# Patient Record
Sex: FEMALE | Race: WHITE
[De-identification: names, ages, dates, MRNs, and addresses within clinical notes are randomized per-mention and may not be internally consistent; named-entity substitution may affect disease eponyms.]

---

## 2018-09-15 ENCOUNTER — HOSPITAL ENCOUNTER (EMERGENCY)
Dept: HOSPITAL 89 - ER | Age: 33
Discharge: ANOTHER HOSPITAL | End: 2018-09-15
Payer: SELF-PAY

## 2018-09-15 ENCOUNTER — HOSPITAL ENCOUNTER (OUTPATIENT)
Dept: HOSPITAL 89 - AMB | Age: 33
End: 2018-09-15
Payer: SELF-PAY

## 2018-09-15 VITALS — DIASTOLIC BLOOD PRESSURE: 96 MMHG | SYSTOLIC BLOOD PRESSURE: 138 MMHG

## 2018-09-15 DIAGNOSIS — F31.9: Primary | ICD-10-CM

## 2018-09-15 DIAGNOSIS — R44.0: Primary | ICD-10-CM

## 2018-09-15 LAB — PLATELET COUNT, AUTOMATED: 412 K/UL (ref 150–450)

## 2018-09-15 PROCEDURE — 82947 ASSAY GLUCOSE BLOOD QUANT: CPT

## 2018-09-15 PROCEDURE — 82435 ASSAY OF BLOOD CHLORIDE: CPT

## 2018-09-15 PROCEDURE — 80320 DRUG SCREEN QUANTALCOHOLS: CPT

## 2018-09-15 PROCEDURE — 80329 ANALGESICS NON-OPIOID 1 OR 2: CPT

## 2018-09-15 PROCEDURE — 83735 ASSAY OF MAGNESIUM: CPT

## 2018-09-15 PROCEDURE — 81001 URINALYSIS AUTO W/SCOPE: CPT

## 2018-09-15 PROCEDURE — 84155 ASSAY OF PROTEIN SERUM: CPT

## 2018-09-15 PROCEDURE — 84520 ASSAY OF UREA NITROGEN: CPT

## 2018-09-15 PROCEDURE — 82247 BILIRUBIN TOTAL: CPT

## 2018-09-15 PROCEDURE — 85025 COMPLETE CBC W/AUTO DIFF WBC: CPT

## 2018-09-15 PROCEDURE — 82374 ASSAY BLOOD CARBON DIOXIDE: CPT

## 2018-09-15 PROCEDURE — 84460 ALANINE AMINO (ALT) (SGPT): CPT

## 2018-09-15 PROCEDURE — 82040 ASSAY OF SERUM ALBUMIN: CPT

## 2018-09-15 PROCEDURE — 84132 ASSAY OF SERUM POTASSIUM: CPT

## 2018-09-15 PROCEDURE — 84075 ASSAY ALKALINE PHOSPHATASE: CPT

## 2018-09-15 PROCEDURE — 99285 EMERGENCY DEPT VISIT HI MDM: CPT

## 2018-09-15 PROCEDURE — 81025 URINE PREGNANCY TEST: CPT

## 2018-09-15 PROCEDURE — 84450 TRANSFERASE (AST) (SGOT): CPT

## 2018-09-15 PROCEDURE — 80305 DRUG TEST PRSMV DIR OPT OBS: CPT

## 2018-09-15 PROCEDURE — 84443 ASSAY THYROID STIM HORMONE: CPT

## 2018-09-15 PROCEDURE — 36415 COLL VENOUS BLD VENIPUNCTURE: CPT

## 2018-09-15 PROCEDURE — 82565 ASSAY OF CREATININE: CPT

## 2018-09-15 PROCEDURE — 84295 ASSAY OF SERUM SODIUM: CPT

## 2018-09-15 PROCEDURE — 82310 ASSAY OF CALCIUM: CPT

## 2018-09-15 NOTE — ER REPORT
History and Physical


Time Seen By MD:  16:10


HPI/ROS


CHIEF COMPLAINT: Needs bipolar meds adjusted





HISTORY OF PRESENT ILLNESS: 33-year-old female patient presents to emergency 


room with complaint of needing to have her bipolar meds adjusted. Patient states


she was at [facility] today and became very irate. She states police were called 


and they did bring her to the emergency room. Patient states she feels better 


now, however she feels she is very shaky and feels like she needs to have her 


medications adjusted. Patient states that she has been having problems with 


bipolar for years. She states that she has often had blood work done as well as 


urinalysis done to have this evaluated. Patient states that she feels like she 


needs to be admitted. She states she was traveling with family, but her family 


has continued on without her. Patient denies any suicidal ideation.





REVIEW OF SYSTEMS:


Respiratory: No cough, no dyspnea.


Cardiovascular: No chest pain, no palpitations.


Gastrointestinal: No vomiting, no abdominal pain.


Musculoskeletal: No back pain.


Allergies:  


Coded Allergies:  


     UNABLE TO OBTAIN (Unverified , 9/15/18)


Home Meds


Reported Medications


Topiramate (TOPIRAMATE) 100 Mg Tablet, 100 MG PO BID


   9/15/18


Olanzapine (ZYPREXA) 20 Mg Tablet, 20 MG PO QDAY


   9/15/18


Lamotrigine (LAMOTRIGINE) 100 Mg Tablet, 100 MG PO BID


   9/15/18


Past Medical/Surgical History


Patient has a past medical history of bipolar.


Patient denies any surgical history.


Reviewed Nurses Notes:  Yes


Constitutional





Vital Sign - Last 24 Hours








 9/15/18 9/15/18 9/15/18 9/15/18





 16:07 16:15 16:45 17:00


 


Temp 98.7   


 


Pulse 137 131 117 122


 


Resp 18 13 23 15


 


B/P (MAP) 149/90   142/90 (107)


 


Pulse Ox 92 93  


 


O2 Delivery Room Air   


 


    





 9/15/18 9/15/18 9/15/18 9/15/18





 17:15 17:45 17:54 18:00


 


Pulse 120 130  114


 


Resp 17 22  12


 


B/P (MAP)   137/91 (106) 120/92 (101)


 


Pulse Ox    93





 9/15/18 9/15/18 9/15/18 9/15/18





 18:15 18:30 18:45 19:00


 


Pulse 114 105 105 106


 


Resp 22 18 12 9


 


B/P (MAP)  133/86 (102)  138/96 (110)


 


Pulse Ox 92   





 9/15/18 9/15/18  





 19:05 19:20  


 


Pulse 112 102  


 


Resp 14 11  


 


Pulse Ox  93  








Physical Exam


  General Appearance: The patient is alert, has no immediate need for airway 


protection and no current signs of toxicity.


Respiratory: Chest is non tender, lungs are clear to auscultation.


Cardiac: regular rate and rhythm


Gastrointestinal: Abdomen is soft and non tender, no masses, bowel sounds normal


.


Musculoskeletal:  Neck: Neck is supple and non tender.


   Extremities have full range of motion and are non tender.


Skin: No rashes or lesions.


Psych: Patient does have pressured speech, she has flight of ideas, she is 


difficult time maintaining her train of thought.





DIFFERENTIAL DIAGNOSIS: After history and physical exam differential diagnosis 


was considered for bipolar, manic episode.





Medical Decision Making


Data Points


Result Diagram:  


9/15/18 1648                                                                    


           9/15/18 1637





Laboratory





Hematology








Test


 9/15/18


08:21 9/15/18


16:37 9/15/18


16:48


 


Urine Color  Yellow  


 


Urine Clarity


 


 Slightly-cloudy


 





 


Urine pH


 


 5.0 pH


(4.8-9.5) 





 


Urine Specific Gravity  1.017  


 


Urine Protein


 


 Negative mg/dL


(NEGATIVE) 





 


Urine Glucose (UA)


 


 Negative mg/dL


(NEGATIVE) 





 


Urine Ketones


 


 Negative mg/dL


(NEGATIVE) 





 


Urine Blood


 


 Negative


(NEGATIVE) 





 


Urine Nitrite


 


 Negative


(NEGATIVE) 





 


Urine Bilirubin


 


 Negative


(NEGATIVE) 





 


Urine Urobilinogen


 


 2.0 mg/dL


(0.2-1.9) 





 


Urine Leukocyte Esterase


 


 Negative


(NEGATIVE) 





 


Urine RBC


 


 None /HPF


(0-2/HPF) 





 


Urine WBC


 


 2 /HPF


(0-5/HPF) 





 


Urine Squamous Epithelial


Cells 


 Many /LPF


(</=FEW) 





 


Urine Bacteria


 


 Negative /HPF


(NONE-FEW) 





 


Urine Hyaline Casts


 


 Few /LPF


(NONE-FEW) 





 


Urine Mucus


 


 Few /HPF


(NONE-FEW) 





 


Urine HCG, Qualitative


 


 Negative


(NEGATIVE) 





 


Sodium Level


 


 140 mmol/L


(137-145) 





 


Potassium Level


 


 3.6 mmol/L


(3.5-5.0) 





 


Chloride Level


 


 99 mmol/L


() 





 


Carbon Dioxide Level


 


 27 mmol/L


(22-31) 





 


Blood Urea Nitrogen  4 mg/dl (7-18)  


 


Creatinine


 


 0.80 mg/dl


(0.52-1.04) 





 


Glomerular Filtration Rate


Calc 


 > 60.0 


 





 


Random Glucose


 


 121 mg/dl


() 





 


Calcium Level


 


 9.5 mg/dl


(8.4-10.2) 





 


Magnesium Level


 


 1.7 mg/dl


(1.7-2.2) 





 


Total Bilirubin


 


 0.4 mg/dl


(0.2-1.3) 





 


Aspartate Amino Transf


(AST/SGOT) 


 26 U/L (0-35) 


 





 


Alanine Aminotransferase


(ALT/SGPT) 


 55 U/L (0-56) 


 





 


Alkaline Phosphatase  31 U/L (0-126)  


 


Total Protein


 


 7.7 g/dl


(6.3-8.2) 





 


Albumin


 


 4.2 g/dl


(3.5-5.0) 





 


Salicylates Level  < 10 mg/L  


 


Salicylate Last Dose Date  unk  


 


Urine Opiates Screen  Negative  


 


Acetaminophen Level  < 10 ug/ml  


 


Urine Barbiturates Screen  Negative  


 


Ur Tricyclic Antidepressants


Screen 


 Negative 


 





 


Urine Phencyclidine Screen  Negative  


 


Urine Amphetamines Screen  Negative  


 


Urine Benzodiazepines Screen  Negative  


 


Urine Cocaine Screen  Negative  


 


Urine Cannabinoids Screen  Negative  


 


Serum Alcohol  < 10 mg/dl  


 


Red Blood Count


 


 


 4.75 M/uL


(4.17-5.56)


 


Mean Corpuscular Volume


 


 


 88.9 fL


(80.0-96.0)


 


Mean Corpuscular Hemoglobin


 


 


 30.3 pg


(26.0-33.0)


 


Mean Corpuscular Hemoglobin


Concent 


 


 34.1 g/dL


(32.0-36.0)


 


Red Cell Distribution Width


 


 


 12.6 %


(11.5-14.5)


 


Mean Platelet Volume


 


 


 7.2 fL


(7.2-11.1)


 


Neutrophils (%) (Auto)


 


 


 82.5 %


(39.4-72.5)


 


Lymphocytes (%) (Auto)


 


 


 13.0 %


(17.6-49.6)


 


Monocytes (%) (Auto)


 


 


 3.5 %


(4.1-12.4)


 


Eosinophils (%) (Auto)


 


 


 0.7 %


(0.4-6.7)


 


Basophils (%) (Auto)


 


 


 0.3 %


(0.3-1.4)


 


Nucleated RBC Relative Count


(auto) 


 


 0.0 /100WBC 





 


Neutrophils # (Auto)


 


 


 6.9 K/uL


(2.0-7.4)


 


Lymphocytes # (Auto)


 


 


 1.1 K/uL


(1.3-3.6)


 


Monocytes # (Auto)


 


 


 0.3 K/uL


(0.3-1.0)


 


Eosinophils # (Auto)


 


 


 0.1 K/uL


(0.0-0.5)


 


Basophils # (Auto)


 


 


 0.0 K/uL


(0.0-0.1)


 


Nucleated RBC Absolute Count


(auto) 


 


 0.00 K/uL 











Chemistry








Test


 9/15/18


08:21 9/15/18


16:37 9/15/18


16:48


 


Urine Color  Yellow  


 


Urine Clarity


 


 Slightly-cloudy


 





 


Urine pH


 


 5.0 pH


(4.8-9.5) 





 


Urine Specific Gravity  1.017  


 


Urine Protein


 


 Negative mg/dL


(NEGATIVE) 





 


Urine Glucose (UA)


 


 Negative mg/dL


(NEGATIVE) 





 


Urine Ketones


 


 Negative mg/dL


(NEGATIVE) 





 


Urine Blood


 


 Negative


(NEGATIVE) 





 


Urine Nitrite


 


 Negative


(NEGATIVE) 





 


Urine Bilirubin


 


 Negative


(NEGATIVE) 





 


Urine Urobilinogen


 


 2.0 mg/dL


(0.2-1.9) 





 


Urine Leukocyte Esterase


 


 Negative


(NEGATIVE) 





 


Urine RBC


 


 None /HPF


(0-2/HPF) 





 


Urine WBC


 


 2 /HPF


(0-5/HPF) 





 


Urine Squamous Epithelial


Cells 


 Many /LPF


(</=FEW) 





 


Urine Bacteria


 


 Negative /HPF


(NONE-FEW) 





 


Urine Hyaline Casts


 


 Few /LPF


(NONE-FEW) 





 


Urine Mucus


 


 Few /HPF


(NONE-FEW) 





 


Urine HCG, Qualitative


 


 Negative


(NEGATIVE) 





 


Glomerular Filtration Rate


Calc 


 > 60.0 


 





 


Calcium Level


 


 9.5 mg/dl


(8.4-10.2) 





 


Magnesium Level


 


 1.7 mg/dl


(1.7-2.2) 





 


Total Bilirubin


 


 0.4 mg/dl


(0.2-1.3) 





 


Aspartate Amino Transf


(AST/SGOT) 


 26 U/L (0-35) 


 





 


Alanine Aminotransferase


(ALT/SGPT) 


 55 U/L (0-56) 


 





 


Alkaline Phosphatase  31 U/L (0-126)  


 


Total Protein


 


 7.7 g/dl


(6.3-8.2) 





 


Albumin


 


 4.2 g/dl


(3.5-5.0) 





 


Salicylates Level  < 10 mg/L  


 


Salicylate Last Dose Date  unk  


 


Urine Opiates Screen  Negative  


 


Acetaminophen Level  < 10 ug/ml  


 


Urine Barbiturates Screen  Negative  


 


Ur Tricyclic Antidepressants


Screen 


 Negative 


 





 


Urine Phencyclidine Screen  Negative  


 


Urine Amphetamines Screen  Negative  


 


Urine Benzodiazepines Screen  Negative  


 


Urine Cocaine Screen  Negative  


 


Urine Cannabinoids Screen  Negative  


 


Serum Alcohol  < 10 mg/dl  


 


White Blood Count


 


 


 8.3 k/uL


(4.5-11.0)


 


Red Blood Count


 


 


 4.75 M/uL


(4.17-5.56)


 


Hemoglobin


 


 


 14.4 g/dL


(12.0-16.0)


 


Hematocrit


 


 


 42.2 %


(34.0-47.0)


 


Mean Corpuscular Volume


 


 


 88.9 fL


(80.0-96.0)


 


Mean Corpuscular Hemoglobin


 


 


 30.3 pg


(26.0-33.0)


 


Mean Corpuscular Hemoglobin


Concent 


 


 34.1 g/dL


(32.0-36.0)


 


Red Cell Distribution Width


 


 


 12.6 %


(11.5-14.5)


 


Platelet Count


 


 


 412 K/uL


(150-450)


 


Mean Platelet Volume


 


 


 7.2 fL


(7.2-11.1)


 


Neutrophils (%) (Auto)


 


 


 82.5 %


(39.4-72.5)


 


Lymphocytes (%) (Auto)


 


 


 13.0 %


(17.6-49.6)


 


Monocytes (%) (Auto)


 


 


 3.5 %


(4.1-12.4)


 


Eosinophils (%) (Auto)


 


 


 0.7 %


(0.4-6.7)


 


Basophils (%) (Auto)


 


 


 0.3 %


(0.3-1.4)


 


Nucleated RBC Relative Count


(auto) 


 


 0.0 /100WBC 





 


Neutrophils # (Auto)


 


 


 6.9 K/uL


(2.0-7.4)


 


Lymphocytes # (Auto)


 


 


 1.1 K/uL


(1.3-3.6)


 


Monocytes # (Auto)


 


 


 0.3 K/uL


(0.3-1.0)


 


Eosinophils # (Auto)


 


 


 0.1 K/uL


(0.0-0.5)


 


Basophils # (Auto)


 


 


 0.0 K/uL


(0.0-0.1)


 


Nucleated RBC Absolute Count


(auto) 


 


 0.00 K/uL 











Toxicology








Test


 9/15/18


16:37


 


Salicylates Level < 10 mg/L 


 


Salicylate Last Dose Date unk 


 


Urine Opiates Screen Negative 


 


Acetaminophen Level < 10 ug/ml 


 


Urine Barbiturates Screen Negative 


 


Ur Tricyclic Antidepressants


Screen Negative 





 


Urine Phencyclidine Screen Negative 


 


Urine Amphetamines Screen Negative 


 


Urine Benzodiazepines Screen Negative 


 


Urine Cocaine Screen Negative 


 


Urine Cannabinoids Screen Negative 


 


Serum Alcohol < 10 mg/dl 








Urinalysis








Test


 9/15/18


16:37


 


Urine Color Yellow 


 


Urine Clarity


 Slightly-cloudy





 


Urine pH


 5.0 pH


(4.8-9.5)


 


Urine Specific Gravity 1.017 


 


Urine Protein


 Negative mg/dL


(NEGATIVE)


 


Urine Glucose (UA)


 Negative mg/dL


(NEGATIVE)


 


Urine Ketones


 Negative mg/dL


(NEGATIVE)


 


Urine Blood


 Negative


(NEGATIVE)


 


Urine Nitrite


 Negative


(NEGATIVE)


 


Urine Bilirubin


 Negative


(NEGATIVE)


 


Urine Urobilinogen


 2.0 mg/dL


(0.2-1.9)


 


Urine Leukocyte Esterase


 Negative


(NEGATIVE)


 


Urine RBC


 None /HPF


(0-2/HPF)


 


Urine WBC


 2 /HPF


(0-5/HPF)


 


Urine Squamous Epithelial


Cells Many /LPF


(</=FEW)


 


Urine Bacteria


 Negative /HPF


(NONE-FEW)


 


Urine Hyaline Casts


 Few /LPF


(NONE-FEW)


 


Urine Mucus


 Few /HPF


(NONE-FEW)


 


Urine HCG, Qualitative


 Negative


(NEGATIVE)











ED Course/Re-evaluation


ED Course


Patient was admitted to exam room, history and physical were obtained. Dif


ferential diagnoses were considered. On examination patient was very manic, she 


is hard time completing sentences. She was talking very quickly with pressured 


speech. She is also very shaky. Heart was tachycardia, lungs were clear, abdomen


soft nontender. Lab work for a behavioral health admission were done. The labs 


were unremarkable. Patient was requesting admission to get her medications 


stabilized. I did discuss this with Nicole Cid, nurse practitioner, who 


said that the unit here was full was not able to care for the patient. We then 


called up to [facility] and sent them the note as well as the lab work. Reviewed that 


and requested a psych eval. We received a call at approximately [phone] stating that


they would be able to accept the patient. Patient will be transferred to the [facility]


for treatment and evaluation. I discussed with the patient who verbalized 


understanding and agreement with plan.


Decision to Disposition Date:  Sep 15, 2018


Decision to Disposition Time:  19:08





Depart


Departure


Latest Vital Signs





Vital Signs








  Date Time  Temp Pulse Resp B/P (MAP) Pulse Ox O2 Delivery O2 Flow Rate FiO2


 


9/15/18 19:20  102 11  93   


 


9/15/18 19:00    138/96 (110)    


 


9/15/18 16:07 98.7     Room Air  








Impression:  


   Primary Impression:  


   Jonelle


   Additional Impression:  


   Bipolar 1 disorder, manic, moderate


Condition:  Condition Unchanged


Disposition:  [facility] TO [facility]





Problem Qualifiers











KARI MONTGOMERY                Sep 15, 2018 16:11